# Patient Record
Sex: FEMALE | Race: WHITE | NOT HISPANIC OR LATINO
[De-identification: names, ages, dates, MRNs, and addresses within clinical notes are randomized per-mention and may not be internally consistent; named-entity substitution may affect disease eponyms.]

---

## 2018-02-21 ENCOUNTER — RESULT REVIEW (OUTPATIENT)
Age: 58
End: 2018-02-21

## 2019-02-22 ENCOUNTER — RESULT REVIEW (OUTPATIENT)
Age: 59
End: 2019-02-22

## 2020-06-09 ENCOUNTER — RESULT REVIEW (OUTPATIENT)
Age: 60
End: 2020-06-09

## 2021-04-02 PROBLEM — Z00.00 ENCOUNTER FOR PREVENTIVE HEALTH EXAMINATION: Status: ACTIVE | Noted: 2021-04-02

## 2021-05-10 ENCOUNTER — NON-APPOINTMENT (OUTPATIENT)
Age: 61
End: 2021-05-10

## 2021-05-10 ENCOUNTER — APPOINTMENT (OUTPATIENT)
Dept: BREAST CENTER | Facility: CLINIC | Age: 61
End: 2021-05-10
Payer: MEDICARE

## 2021-05-10 VITALS
OXYGEN SATURATION: 100 % | SYSTOLIC BLOOD PRESSURE: 122 MMHG | HEART RATE: 57 BPM | HEIGHT: 69 IN | WEIGHT: 154 LBS | DIASTOLIC BLOOD PRESSURE: 73 MMHG | BODY MASS INDEX: 22.81 KG/M2

## 2021-05-10 DIAGNOSIS — Z80.42 FAMILY HISTORY OF MALIGNANT NEOPLASM OF PROSTATE: ICD-10-CM

## 2021-05-10 DIAGNOSIS — Z78.9 OTHER SPECIFIED HEALTH STATUS: ICD-10-CM

## 2021-05-10 DIAGNOSIS — Z80.8 FAMILY HISTORY OF MALIGNANT NEOPLASM OF OTHER ORGANS OR SYSTEMS: ICD-10-CM

## 2021-05-10 DIAGNOSIS — Z80.3 FAMILY HISTORY OF MALIGNANT NEOPLASM OF BREAST: ICD-10-CM

## 2021-05-10 PROCEDURE — 99213 OFFICE O/P EST LOW 20 MIN: CPT

## 2021-05-10 PROCEDURE — 99072 ADDL SUPL MATRL&STAF TM PHE: CPT

## 2021-05-10 RX ORDER — ELECTROLYTES/DEXTROSE
SOLUTION, ORAL ORAL
Refills: 0 | Status: ACTIVE | COMMUNITY

## 2021-05-10 NOTE — HISTORY OF PRESENT ILLNESS
[FreeTextEntry1] : The patient is a 61-year-old  postmenopausal white female.  She underwent menarche at age 14 and had her first child at age 31.  She has no history of any hormone replacement therapy.  She has a family history with her maternal grandmother who  of breast cancer in her 40s.  The patient was found to have calcifications in the upper outer aspect of the left breast in  and stereotactic biopsy showed LCIS.  MRI showed no other suspicious findings and she underwent a left breast wide excision on 2010 which showed no further LCIS.  She later had a right breast stereotactic core biopsy on 2013 for calcifications that turned out to be benign.  She does have a Elenita model lifetime risk of breast cancer of 26% and was offered risk reduction strategies with either Evista or tamoxifen but refused.  She comes in for routine yearly follow-up and continues to get yearly mammography and ultrasound and only MRIs as needed.

## 2021-05-10 NOTE — PHYSICAL EXAM
[Normocephalic] : normocephalic [Atraumatic] : atraumatic [EOMI] : extra ocular movement intact [Supple] : supple [No Supraclavicular Adenopathy] : no supraclavicular adenopathy [No Cervical Adenopathy] : no cervical adenopathy [Examined in the supine and seated position] : examined in the supine and seated position [Breast Mass Right Breast ___cm] : no masses [Breast Mass Left Breast ___cm] : no masses [Breast Nipple Inversion] : nipples not inverted [Breast Nipple Retraction] : nipples not retracted [Breast Nipple Flattening] : nipples not flattened [Breast Nipple Fissures] : nipples not fissured [Breast Abnormal Lactation (Galactorrhea)] : no galactorrhea [Breast Abnormal Secretion Bloody Fluid] : no bloody discharge [Breast Abnormal Secretion Serous Fluid] : no serous discharge [Breast Abnormal Secretion Opalescent Fluid] : no milky discharge [No Axillary Lymphadenopathy] : no right axillary lymphadenopathy [No Edema] : no edema [No Rashes] : no rashes [No Ulceration] : no ulceration [de-identified] : On exam, the patient has A-cup breasts.  On palpation I cannot feel any suspicious densities in either breast.  I can still feel a soft palpable node in the left axilla but she is fairly thin.  She has some scarring changes around the left breast wide excision site.  She has no axillary, supraclavicular, or cervical adenopathy. [de-identified] : Soft left axillary not clinically suspicious lymph node

## 2021-05-10 NOTE — REASON FOR VISIT
[Follow-Up: _____] : a [unfilled] follow-up visit [FreeTextEntry1] : The patient comes in for routine follow-up with a family history of breast cancer and a personal history of a left breast wide excision in 2010 for LCIS with an increased Elenita model risk.

## 2021-05-10 NOTE — ASSESSMENT
[FreeTextEntry1] : The patient is a 61-year-old  postmenopausal white female.  She underwent menarche at age 14 and had her first child at age 31.  She has no history of any hormone replacement therapy.  She has a family history with her maternal grandmother who  of breast cancer in her 40s.  The patient was found to have calcifications in the upper outer aspect of the left breast in  and stereotactic biopsy showed LCIS.  MRI showed no other suspicious findings and she underwent a left breast wide excision on 2010 which showed no further LCIS.  She later had a right breast stereotactic core biopsy on 2013 for calcifications that turned out to be benign.  She does have a Elenita model lifetime risk of breast cancer of 26% and was offered risk reduction strategies with either Evista or tamoxifen but refused.  On exam today I cannot feel any suspicious densities in either breast.  She underwent a bilateral mammography and ultrasound today here at [facility] on May 10, 2021 which showed some stable bilateral calcifications.  The patient was reassured and should continue yearly follow-up and will be due for her next bilateral mammography and ultrasound next year in May 2022.  She was asking about an MRI but given her fairly straightforward exam and the unchanged mammography and ultrasound, I see no need for routine yearly MRI and we can just use this intermittently as needed.

## 2022-05-19 ENCOUNTER — APPOINTMENT (OUTPATIENT)
Dept: BREAST CENTER | Facility: CLINIC | Age: 62
End: 2022-05-19
Payer: COMMERCIAL

## 2022-05-19 DIAGNOSIS — Z91.89 OTHER SPECIFIED PERSONAL RISK FACTORS, NOT ELSEWHERE CLASSIFIED: ICD-10-CM

## 2022-05-19 PROCEDURE — 99213 OFFICE O/P EST LOW 20 MIN: CPT

## 2022-05-19 NOTE — ASSESSMENT
[FreeTextEntry1] : The patient is a 62-year-old  postmenopausal white female.  She underwent menarche at age 14 and had her first child at age 31.  She has no history of any hormone replacement therapy.  She has a family history with her maternal grandmother who  of breast cancer in her 40s.  The patient was found to have calcifications in the upper outer aspect of the left breast in  and stereotactic biopsy showed LCIS.  MRI showed no other suspicious findings and she underwent a left breast wide excision on 2010 which showed no further LCIS.  She later had a right breast stereotactic core biopsy on 2013 for calcifications that turned out to be benign.  She does have a Elenita model lifetime risk of breast cancer of 26% and was offered risk reduction strategies with either Evista or tamoxifen but refused.  On exam today, I cannot feel any suspicious densities in either breast.  She underwent her last bilateral mammography and ultrasound was reviewed from [facility] and performed today on May 19, 2022 which showed some stable bilateral calcifications.  The patient was reassured and should continue yearly follow-up and will be due for her next bilateral mammography and ultrasound next year in May 2023.  She was asking about an MRI again since the radiologist was recommending it.  I have no problem with getting an MRI at the end of the year may be in 2022.  We will get preapproval at that time.  If that is negative, I think we can use MRIs intermittently.

## 2022-05-19 NOTE — HISTORY OF PRESENT ILLNESS
[FreeTextEntry1] : The patient is a 62-year-old  postmenopausal white female.  She underwent menarche at age 14 and had her first child at age 31.  She has no history of any hormone replacement therapy.  She has a family history with her maternal grandmother who  of breast cancer in her 40s.  The patient was found to have calcifications in the upper outer aspect of the left breast in  and stereotactic biopsy showed LCIS.  MRI showed no other suspicious findings and she underwent a left breast wide excision on 2010 which showed no further LCIS.  She later had a right breast stereotactic core biopsy on 2013 for calcifications that turned out to be benign.  She does have a Elenita model lifetime risk of breast cancer of 26% and was offered risk reduction strategies with either Evista or tamoxifen but refused.  She comes in for routine yearly follow-up and continues to get yearly mammography and ultrasound and only MRIs as needed.

## 2022-05-19 NOTE — PHYSICAL EXAM
[Normocephalic] : normocephalic [Atraumatic] : atraumatic [EOMI] : extra ocular movement intact [Supple] : supple [No Supraclavicular Adenopathy] : no supraclavicular adenopathy [No Cervical Adenopathy] : no cervical adenopathy [Examined in the supine and seated position] : examined in the supine and seated position [Breast Mass Right Breast ___cm] : no masses [Breast Mass Left Breast ___cm] : no masses [No Edema] : no edema [No Rashes] : no rashes [No Ulceration] : no ulceration [No dominant masses] : no dominant masses in right breast  [No dominant masses] : no dominant masses left breast [No Nipple Retraction] : no left nipple retraction [No Nipple Discharge] : no left nipple discharge [Breast Nipple Inversion] : nipples not inverted [Breast Nipple Retraction] : nipples not retracted [Breast Nipple Flattening] : nipples not flattened [Breast Nipple Fissures] : nipples not fissured [Breast Abnormal Lactation (Galactorrhea)] : no galactorrhea [Breast Abnormal Secretion Bloody Fluid] : no bloody discharge [Breast Abnormal Secretion Serous Fluid] : no serous discharge [Breast Abnormal Secretion Opalescent Fluid] : no milky discharge [No Axillary Lymphadenopathy] : no left axillary lymphadenopathy [de-identified] : On exam, the patient has A-cup breasts.  On palpation I cannot feel any suspicious densities in either breast.  I can still feel a soft palpable node in the left axilla but she is fairly thin.  She has some scarring changes around the left breast wide excision site.  She has no axillary, supraclavicular, or cervical adenopathy.

## 2022-06-02 ENCOUNTER — RESULT REVIEW (OUTPATIENT)
Age: 62
End: 2022-06-02

## 2023-05-24 ENCOUNTER — NON-APPOINTMENT (OUTPATIENT)
Age: 63
End: 2023-05-24

## 2023-05-24 NOTE — PHYSICAL EXAM
[Normocephalic] : normocephalic [Atraumatic] : atraumatic [EOMI] : extra ocular movement intact [Supple] : supple [No Supraclavicular Adenopathy] : no supraclavicular adenopathy [No Cervical Adenopathy] : no cervical adenopathy [Examined in the supine and seated position] : examined in the supine and seated position [No dominant masses] : no dominant masses in right breast  [No dominant masses] : no dominant masses left breast [No Nipple Retraction] : no left nipple retraction [No Nipple Discharge] : no left nipple discharge [Breast Mass Right Breast ___cm] : no masses [Breast Mass Left Breast ___cm] : no masses [No Axillary Lymphadenopathy] : no left axillary lymphadenopathy [No Edema] : no edema [No Rashes] : no rashes [No Ulceration] : no ulceration [Breast Nipple Inversion] : nipples not inverted [Breast Nipple Retraction] : nipples not retracted [Breast Nipple Flattening] : nipples not flattened [Breast Nipple Fissures] : nipples not fissured [Breast Abnormal Lactation (Galactorrhea)] : no galactorrhea [Breast Abnormal Secretion Bloody Fluid] : no bloody discharge [Breast Abnormal Secretion Opalescent Fluid] : no milky discharge [Breast Abnormal Secretion Serous Fluid] : no serous discharge [de-identified] : On exam, the patient has A-cup breasts.  On palpation I cannot feel any suspicious densities in either breast.  I can still feel a soft palpable node in the left axilla but she is fairly thin.  She has some scarring changes around the left breast wide excision site.  She has no axillary, supraclavicular, or cervical adenopathy.

## 2023-05-24 NOTE — ASSESSMENT
[FreeTextEntry1] : The patient is a 63-year-old  postmenopausal white female.  She underwent menarche at age 14 and had her first child at age 31.  She has no history of any hormone replacement therapy.  She has a family history with her maternal grandmother who  of breast cancer in her 40s.  The patient was found to have calcifications in the upper outer aspect of the left breast in  and stereotactic biopsy showed LCIS.  MRI showed no other suspicious findings and she underwent a left breast wide excision on 2010 which showed no further LCIS.  She later had a right breast stereotactic core biopsy on 2013 for calcifications that turned out to be benign.  She does have a Elenita model lifetime risk of breast cancer of 26% and was offered risk reduction strategies with either Evista or tamoxifen but refused.  On exam today, I cannot feel any suspicious densities in either breast.  She underwent her last bilateral mammography and ultrasound was reviewed from [facility] and performed today on ??????? May 19, 2022 which showed some stable bilateral calcifications.  The patient was reassured and should continue yearly follow-up and will be due for her next bilateral mammography and ultrasound next year in ???????? May 2024.  She was asking about an MRI again since the radiologist was recommending it.  I have no problem with getting an MRI at the end of the year to stagger her studies possibly in 2022.  We will get preapproval at that time.  If that is negative, I think we can use MRIs intermittently.

## 2023-05-24 NOTE — HISTORY OF PRESENT ILLNESS
[FreeTextEntry1] : The patient is a 63-year-old  postmenopausal white female.  She underwent menarche at age 14 and had her first child at age 31.  She has no history of any hormone replacement therapy.  She has a family history with her maternal grandmother who  of breast cancer in her 40s.  The patient was found to have calcifications in the upper outer aspect of the left breast in  and stereotactic biopsy showed LCIS.  MRI showed no other suspicious findings and she underwent a left breast wide excision on 2010 which showed no further LCIS.  She later had a right breast stereotactic core biopsy on 2013 for calcifications that turned out to be benign.  She does have a Elenita model lifetime risk of breast cancer of 26% and was offered risk reduction strategies with either Evista or tamoxifen but refused.  She comes in for routine yearly follow-up and continues to get yearly mammography and ultrasound and only MRIs as needed.

## 2023-05-31 ENCOUNTER — RESULT REVIEW (OUTPATIENT)
Age: 63
End: 2023-05-31

## 2023-06-01 ENCOUNTER — APPOINTMENT (OUTPATIENT)
Dept: BREAST CENTER | Facility: CLINIC | Age: 63
End: 2023-06-01
Payer: COMMERCIAL

## 2023-06-01 PROCEDURE — 99213 OFFICE O/P EST LOW 20 MIN: CPT

## 2023-06-01 NOTE — PHYSICAL EXAM
[Normocephalic] : normocephalic [Atraumatic] : atraumatic [EOMI] : extra ocular movement intact [Supple] : supple [No Supraclavicular Adenopathy] : no supraclavicular adenopathy [No Cervical Adenopathy] : no cervical adenopathy [Examined in the supine and seated position] : examined in the supine and seated position [No dominant masses] : no dominant masses in right breast  [No dominant masses] : no dominant masses left breast [No Nipple Retraction] : no left nipple retraction [No Nipple Discharge] : no left nipple discharge [Breast Mass Right Breast ___cm] : no masses [Breast Mass Left Breast ___cm] : no masses [No Axillary Lymphadenopathy] : no left axillary lymphadenopathy [No Edema] : no edema [No Rashes] : no rashes [No Ulceration] : no ulceration [Breast Nipple Inversion] : nipples not inverted [Breast Nipple Retraction] : nipples not retracted [Breast Nipple Flattening] : nipples not flattened [Breast Nipple Fissures] : nipples not fissured [Breast Abnormal Lactation (Galactorrhea)] : no galactorrhea [Breast Abnormal Secretion Bloody Fluid] : no bloody discharge [Breast Abnormal Secretion Serous Fluid] : no serous discharge [Breast Abnormal Secretion Opalescent Fluid] : no milky discharge [de-identified] : On exam, the patient has A-cup breasts.  On palpation I cannot feel any suspicious densities in either breast.  I can still feel a soft palpable node in the left axilla but she is fairly thin.  She has some scarring changes around the left breast wide excision site.  She has no axillary, supraclavicular, or cervical adenopathy.

## 2023-06-01 NOTE — ASSESSMENT
[FreeTextEntry1] : The patient is a 63-year-old  postmenopausal white female.  She underwent menarche at age 14 and had her first child at age 31.  She has no history of any hormone replacement therapy.  She has a family history with her maternal grandmother who  of breast cancer in her 40s.  The patient was found to have calcifications in the upper outer aspect of the left breast in  and stereotactic biopsy showed LCIS.  MRI showed no other suspicious findings and she underwent a left breast wide excision on 2010 which showed no further LCIS.  She later had a right breast stereotactic core biopsy on 2013 for calcifications that turned out to be benign.  She does have a Elenita model lifetime risk of breast cancer of 26% and was offered risk reduction strategies with either Evista or tamoxifen but refused.  On exam today, I cannot feel any suspicious densities in either breast.  She underwent her last bilateral mammography and ultrasound was reviewed from [facility] and performed today on 2023 which showed some stable bilateral calcifications.  The patient was reassured and should continue yearly follow-up and will be due for her next bilateral mammography and ultrasound next year in 2024.  She was asking about an MRI again since the radiologist was recommending it given her increased Elenita model risk I have no problem with this and will have her get an MRI at the end of the year in 2023 and she will call the office to get preapproval.  We can then continue screening MRI yearly.

## 2023-06-01 NOTE — HISTORY OF PRESENT ILLNESS
[FreeTextEntry1] : The patient is a 63-year-old  postmenopausal white female.  She underwent menarche at age 14 and had her first child at age 31.  She has no history of any hormone replacement therapy.  She has a family history with her maternal grandmother who  of breast cancer in her 40s.  The patient was found to have calcifications in the upper outer aspect of the left breast in  and stereotactic biopsy showed LCIS.  MRI showed no other suspicious findings and she underwent a left breast wide excision on 2010 which showed no further LCIS.  She later had a right breast stereotactic core biopsy on 2013 for calcifications that turned out to be benign.  She does have a Elenita model lifetime risk of breast cancer of 26% and was offered risk reduction strategies with either Evista or tamoxifen but refused.  She comes in for routine yearly follow-up and continues to get yearly mammography and ultrasound and only MRIs.

## 2023-12-06 ENCOUNTER — RESULT REVIEW (OUTPATIENT)
Age: 63
End: 2023-12-06

## 2023-12-07 ENCOUNTER — NON-APPOINTMENT (OUTPATIENT)
Age: 63
End: 2023-12-07

## 2024-06-03 ENCOUNTER — NON-APPOINTMENT (OUTPATIENT)
Age: 64
End: 2024-06-03

## 2024-06-17 NOTE — ASSESSMENT
[FreeTextEntry1] : The patient is a 64-year-old  postmenopausal white female.  She underwent menarche at age 14 and had her first child at age 31.  She has no history of any hormone replacement therapy.  She has a family history with her maternal grandmother who  of breast cancer in her 40s.  The patient was found to have calcifications in the upper outer aspect of the left breast in  and stereotactic biopsy showed LCIS.  MRI showed no other suspicious findings and she underwent a left breast wide excision on 2010 which showed no further LCIS.  She later had a right breast stereotactic core biopsy on 2013 for calcifications that turned out to be benign.  She does have a Elenita model lifetime risk of breast cancer of 26% and was offered risk reduction strategies with either Evista or tamoxifen but refused.  She underwent her last bilateral mammography and ultrasound which was reviewed from [facility] and performed today on ?????? 2023 which showed some stable bilateral calcifications.  She underwent her last bilateral screening MRI which was reviewed from 2023 and performed at [facility] which showed no suspicious findings with the benign intramammary lymph node in the left breast upper outer quadrant.  On exam today, I cannot feel any suspicious densities in either breast.  The patient was reassured and should continue yearly follow-up and will be due for her next bilateral mammography and ultrasound next year in ?????? 2025 and she was given prescriptions.  She will be due for her next bilateral screening MRI in 2024 and was given a prescription.

## 2024-06-17 NOTE — HISTORY OF PRESENT ILLNESS
[FreeTextEntry1] : The patient is a 64-year-old  postmenopausal white female.  She underwent menarche at age 14 and had her first child at age 31.  She has no history of any hormone replacement therapy.  She has a family history with her maternal grandmother who  of breast cancer in her 40s.  The patient was found to have calcifications in the upper outer aspect of the left breast in  and stereotactic biopsy showed LCIS.  MRI showed no other suspicious findings and she underwent a left breast wide excision on 2010 which showed no further LCIS.  She later had a right breast stereotactic core biopsy on 2013 for calcifications that turned out to be benign.  She does have a Elenita model lifetime risk of breast cancer of 26% and was offered risk reduction strategies with either Evista or tamoxifen but refused.  She comes in for routine yearly follow-up and continues to get yearly mammography and ultrasound and only MRIs.

## 2024-06-17 NOTE — PHYSICAL EXAM
[Normocephalic] : normocephalic [Atraumatic] : atraumatic [EOMI] : extra ocular movement intact [Supple] : supple [No Supraclavicular Adenopathy] : no supraclavicular adenopathy [No Cervical Adenopathy] : no cervical adenopathy [Examined in the supine and seated position] : examined in the supine and seated position [No dominant masses] : no dominant masses in right breast  [No dominant masses] : no dominant masses left breast [No Nipple Retraction] : no left nipple retraction [No Nipple Discharge] : no left nipple discharge [Breast Mass Right Breast ___cm] : no masses [Breast Mass Left Breast ___cm] : no masses [No Axillary Lymphadenopathy] : no left axillary lymphadenopathy [No Edema] : no edema [No Rashes] : no rashes [No Ulceration] : no ulceration [Breast Nipple Inversion] : nipples not inverted [Breast Nipple Retraction] : nipples not retracted [Breast Nipple Flattening] : nipples not flattened [Breast Nipple Fissures] : nipples not fissured [Breast Abnormal Lactation (Galactorrhea)] : no galactorrhea [Breast Abnormal Secretion Bloody Fluid] : no bloody discharge [Breast Abnormal Secretion Serous Fluid] : no serous discharge [Breast Abnormal Secretion Opalescent Fluid] : no milky discharge [de-identified] : On exam, the patient has A-cup breasts.  On palpation I cannot feel any suspicious densities in either breast.  I can still feel a soft palpable node in the left axilla but she is fairly thin.  She has some scarring changes around the left breast wide excision site.  She has no axillary, supraclavicular, or cervical adenopathy.

## 2024-06-19 ENCOUNTER — RESULT REVIEW (OUTPATIENT)
Age: 64
End: 2024-06-19

## 2024-06-24 DIAGNOSIS — R92.8 OTHER ABNORMAL AND INCONCLUSIVE FINDINGS ON DIAGNOSTIC IMAGING OF BREAST: ICD-10-CM

## 2024-06-25 ENCOUNTER — RESULT REVIEW (OUTPATIENT)
Age: 64
End: 2024-06-25

## 2024-06-27 ENCOUNTER — APPOINTMENT (OUTPATIENT)
Dept: BREAST CENTER | Facility: CLINIC | Age: 64
End: 2024-06-27
Payer: COMMERCIAL

## 2024-06-27 VITALS
HEIGHT: 69 IN | DIASTOLIC BLOOD PRESSURE: 78 MMHG | HEART RATE: 56 BPM | OXYGEN SATURATION: 100 % | BODY MASS INDEX: 22.66 KG/M2 | SYSTOLIC BLOOD PRESSURE: 114 MMHG | WEIGHT: 153 LBS

## 2024-06-27 DIAGNOSIS — N60.11 DIFFUSE CYSTIC MASTOPATHY OF RIGHT BREAST: ICD-10-CM

## 2024-06-27 DIAGNOSIS — Z12.39 ENCOUNTER FOR OTHER SCREENING FOR MALIGNANT NEOPLASM OF BREAST: ICD-10-CM

## 2024-06-27 DIAGNOSIS — Z80.3 FAMILY HISTORY OF MALIGNANT NEOPLASM OF BREAST: ICD-10-CM

## 2024-06-27 DIAGNOSIS — R92.30 DENSE BREASTS, UNSPECIFIED: ICD-10-CM

## 2024-06-27 DIAGNOSIS — N60.12 DIFFUSE CYSTIC MASTOPATHY OF LEFT BREAST: ICD-10-CM

## 2024-06-27 DIAGNOSIS — Z86.000 PERSONAL HISTORY OF IN-SITU NEOPLASM OF BREAST: ICD-10-CM

## 2024-06-27 PROCEDURE — 99213 OFFICE O/P EST LOW 20 MIN: CPT

## 2024-06-27 NOTE — ASSESSMENT
[FreeTextEntry1] : The patient is a 64-year-old  postmenopausal white female.  She underwent menarche at age 14 and had her first child at age 31.  She has no history of any hormone replacement therapy.  She has a family history with her maternal grandmother who  of breast cancer in her 40s.  The patient was found to have calcifications in the upper outer aspect of the left breast in  and stereotactic biopsy showed LCIS.  MRI showed no other suspicious findings and she underwent a left breast wide excision on 2010 which showed no further LCIS.  She later had a right breast stereotactic core biopsy on 2013 for calcifications that turned out to be benign.  She does have a Elenita model lifetime risk of breast cancer of 26% and was offered risk reduction strategies with either Evista or tamoxifen but refused.  She underwent her last bilateral mammography and ultrasound which was reviewed from [facility] and performed on 2024 which showed some stable bilateral calcifications and there was a right breast 10:00 hypoechoic 10 mm density for which diagnostic directed right breast ultrasound was performed on 2024 showing this to be a benign focally dilated duct with debris.  She underwent her last bilateral screening MRI which was reviewed from 2023 and performed at [facility] which showed no suspicious findings with the benign intramammary lymph node in the left breast upper outer quadrant.  On exam today, I cannot feel any suspicious densities in either breast.  The patient was reassured and should continue yearly follow-up and will be due for her next bilateral mammography and ultrasound next year in 2025 and she was given prescriptions.  She will be due for her next bilateral screening MRI in 2024 and was given a prescription.

## 2024-06-27 NOTE — PHYSICAL EXAM
[Normocephalic] : normocephalic [Atraumatic] : atraumatic [EOMI] : extra ocular movement intact [Supple] : supple [No Supraclavicular Adenopathy] : no supraclavicular adenopathy [No Cervical Adenopathy] : no cervical adenopathy [Examined in the supine and seated position] : examined in the supine and seated position [No dominant masses] : no dominant masses in right breast  [No dominant masses] : no dominant masses left breast [No Nipple Retraction] : no left nipple retraction [No Nipple Discharge] : no left nipple discharge [Breast Mass Right Breast ___cm] : no masses [Breast Mass Left Breast ___cm] : no masses [No Axillary Lymphadenopathy] : no left axillary lymphadenopathy [No Edema] : no edema [No Rashes] : no rashes [No Ulceration] : no ulceration [Breast Nipple Inversion] : nipples not inverted [Breast Nipple Retraction] : nipples not retracted [Breast Nipple Flattening] : nipples not flattened [Breast Nipple Fissures] : nipples not fissured [Breast Abnormal Lactation (Galactorrhea)] : no galactorrhea [Breast Abnormal Secretion Bloody Fluid] : no bloody discharge [Breast Abnormal Secretion Serous Fluid] : no serous discharge [Breast Abnormal Secretion Opalescent Fluid] : no milky discharge [de-identified] : On exam, the patient has A-cup breasts.  On palpation I cannot feel any suspicious densities in either breast.  I can still feel a soft palpable node in the left axilla but she is fairly thin.  She has some scarring changes around the left breast wide excision site.  She has no axillary, supraclavicular, or cervical adenopathy.

## 2024-12-18 ENCOUNTER — APPOINTMENT (OUTPATIENT)
Dept: AFTER HOURS CARE | Facility: EMERGENCY ROOM | Age: 64
End: 2024-12-18

## 2024-12-26 ENCOUNTER — NON-APPOINTMENT (OUTPATIENT)
Age: 64
End: 2024-12-26

## 2025-07-22 ENCOUNTER — RESULT REVIEW (OUTPATIENT)
Age: 65
End: 2025-07-22

## 2025-07-28 ENCOUNTER — APPOINTMENT (OUTPATIENT)
Dept: BREAST CENTER | Facility: CLINIC | Age: 65
End: 2025-07-28
Payer: COMMERCIAL

## 2025-07-28 VITALS
WEIGHT: 155 LBS | HEIGHT: 69 IN | OXYGEN SATURATION: 98 % | DIASTOLIC BLOOD PRESSURE: 63 MMHG | HEART RATE: 57 BPM | BODY MASS INDEX: 22.96 KG/M2 | SYSTOLIC BLOOD PRESSURE: 100 MMHG

## 2025-07-28 DIAGNOSIS — N60.11 DIFFUSE CYSTIC MASTOPATHY OF RIGHT BREAST: ICD-10-CM

## 2025-07-28 DIAGNOSIS — Z80.3 FAMILY HISTORY OF MALIGNANT NEOPLASM OF BREAST: ICD-10-CM

## 2025-07-28 DIAGNOSIS — Z86.000 PERSONAL HISTORY OF IN-SITU NEOPLASM OF BREAST: ICD-10-CM

## 2025-07-28 DIAGNOSIS — N60.12 DIFFUSE CYSTIC MASTOPATHY OF LEFT BREAST: ICD-10-CM

## 2025-07-28 DIAGNOSIS — Z12.39 ENCOUNTER FOR OTHER SCREENING FOR MALIGNANT NEOPLASM OF BREAST: ICD-10-CM

## 2025-07-28 PROCEDURE — 99213 OFFICE O/P EST LOW 20 MIN: CPT
